# Patient Record
Sex: MALE | Race: OTHER
[De-identification: names, ages, dates, MRNs, and addresses within clinical notes are randomized per-mention and may not be internally consistent; named-entity substitution may affect disease eponyms.]

---

## 2019-11-05 ENCOUNTER — HOSPITAL ENCOUNTER (OUTPATIENT)
Dept: HOSPITAL 72 - PAN | Age: 54
Discharge: HOME | End: 2019-11-05
Payer: MEDICAID

## 2019-11-05 VITALS — DIASTOLIC BLOOD PRESSURE: 65 MMHG | SYSTOLIC BLOOD PRESSURE: 108 MMHG

## 2019-11-05 VITALS — BODY MASS INDEX: 19.93 KG/M2 | WEIGHT: 124 LBS | HEIGHT: 66 IN

## 2019-11-05 DIAGNOSIS — R10.9: Primary | ICD-10-CM

## 2019-11-05 DIAGNOSIS — Z93.3: ICD-10-CM

## 2019-11-05 DIAGNOSIS — R19.7: ICD-10-CM

## 2019-11-05 DIAGNOSIS — E11.9: ICD-10-CM

## 2019-11-05 NOTE — CONSULTATION
DATE OF CONSULTATION:  11/03/2019

CHIEF COMPLAINT:  Abdominal pain and diabetes.



HISTORY OF PRESENT ILLNESS:  This is a very pleasant 53-year-old male, who

had apparently a bowel resection possibly for bowel ischemia.  This was

done in 2017.  Since then he has been having diarrhea, which Imodium helps

with constant diarrhea.  The patient is scheduled to have a colostomy

takedown in January and here for complaint of diarrhea.



PAST MEDICAL HISTORY:  Bowel resection.



PAST SURGICAL HISTORY:  As above.



MEDICATIONS:  Please see medication reconciliation list.



ALLERGIES:  No known allergies.



FAMILY HISTORY:  No family history of GI malignancies.



SOCIAL HISTORY:  The patient denies any alcohol usage.  He smokes half a

pack per day.  Denies any drug use.



REVIEW OF SYSTEMS:  Positive for diarrhea.



PHYSICAL EXAMINATION:

VITAL SIGNS:  Temperature 98.2, blood pressure is 108/64, pulse is 80, and

respirations 20.

HEENT:  Normocephalic and atraumatic.  Sclerae anicteric.

NECK:  Supple.  No evidence of obvious lymphadenopathy

CARDIOVASCULAR:  Regular rate and rhythm.  Plus S1 and S2.  No obvious

murmur.

ABDOMEN:  Positive bowel sounds.  Soft and nontender.  Colostomy bag in

place.

EXTREMITIES:  No cyanosis, no clubbing, no edema.



ASSESSMENT/PLAN:  This is a 53-year-old male with diarrhea post colostomy,

which is responding to Imodium.  I explained to the patient that at this

time most probably the diarrhea is from the surgery.  Since the diarrhea

started after the surgery, we will recommend the patient to have the

takedown, wait few months to heal, and then come back for full

colonoscopy.  The patient understood and he stated that he is going to

come back after the surgery to schedule colonoscopy.









  ______________________________________________

  Kenneth Stanley M.D.





DR:  [email or web address]

D:  11/05/2019 11:35

T:  11/05/2019 18:10

JOB#:  [id number]

CC: